# Patient Record
Sex: MALE | Race: WHITE | Employment: FULL TIME | URBAN - METROPOLITAN AREA
[De-identification: names, ages, dates, MRNs, and addresses within clinical notes are randomized per-mention and may not be internally consistent; named-entity substitution may affect disease eponyms.]

---

## 2017-07-07 ENCOUNTER — HOSPITAL ENCOUNTER (OUTPATIENT)
Age: 27
Discharge: HOME | End: 2017-07-07
Attending: EMERGENCY MEDICINE
Payer: COMMERCIAL

## 2017-07-07 VITALS
WEIGHT: 180 LBS | OXYGEN SATURATION: 97 % | RESPIRATION RATE: 14 BRPM | SYSTOLIC BLOOD PRESSURE: 141 MMHG | HEIGHT: 71 IN | HEART RATE: 82 BPM | BODY MASS INDEX: 25.2 KG/M2 | DIASTOLIC BLOOD PRESSURE: 89 MMHG | TEMPERATURE: 98 F

## 2017-07-07 DIAGNOSIS — J02.9 ACUTE VIRAL PHARYNGITIS: Primary | ICD-10-CM

## 2017-07-07 LAB — POCT RAPID STREP: NEGATIVE

## 2017-07-07 PROCEDURE — 99214 OFFICE O/P EST MOD 30 MIN: CPT

## 2017-07-07 PROCEDURE — 87430 STREP A AG IA: CPT | Performed by: EMERGENCY MEDICINE

## 2017-07-07 PROCEDURE — 87081 CULTURE SCREEN ONLY: CPT | Performed by: EMERGENCY MEDICINE

## 2017-07-07 RX ORDER — AZITHROMYCIN 250 MG/1
TABLET, FILM COATED ORAL
Qty: 1 PACKAGE | Refills: 0 | Status: SHIPPED | OUTPATIENT
Start: 2017-07-07 | End: 2017-07-12

## 2017-07-07 NOTE — ED INITIAL ASSESSMENT (HPI)
Pt stated he had a tickle in his throat yesterday. Went to bed early and had a lot of fluids. Woke up today and the throat pain had gotten worse.

## 2017-07-07 NOTE — ED PROVIDER NOTES
Patient Seen in: [facility]    History   Patient presents with:  Sore Throat    Stated Complaint: sore throat    HPI    Patient is a 24-year-old male presents emergency room with a history of sore throat that is been ongoin SpO2 97%   BMI 25.10 kg/m²         Physical Exam    GENERAL: Well-developed, well-nourished male sitting up breathing easily in no apparent distress. Patient is nontoxic in appearance. HEENT: Head is normocephalic, atraumatic.  Pupils are 4 mm equally [id number]  [phone]    Call in 2 days  OR CALL YOUR  MD FOR FOLLOW UP THIS WEEK      Medications Prescribed:  Current Discharge Medication List    START taking these medications    azithromycin (ZITHROMAX Z-TYSHAWN) 250 MG Oral Tab  500 mg once followed by 250

## 2018-07-27 ENCOUNTER — OFFICE VISIT (OUTPATIENT)
Dept: FAMILY MEDICINE CLINIC | Facility: CLINIC | Age: 28
End: 2018-07-27
Payer: COMMERCIAL

## 2018-07-27 VITALS
HEIGHT: 69 IN | SYSTOLIC BLOOD PRESSURE: 122 MMHG | TEMPERATURE: 98 F | BODY MASS INDEX: 30.51 KG/M2 | DIASTOLIC BLOOD PRESSURE: 78 MMHG | RESPIRATION RATE: 12 BRPM | HEART RATE: 84 BPM | WEIGHT: 206 LBS

## 2018-07-27 DIAGNOSIS — R14.0 POSTPRANDIAL BLOATING: Primary | ICD-10-CM

## 2018-07-27 PROCEDURE — 99203 OFFICE O/P NEW LOW 30 MIN: CPT | Performed by: FAMILY MEDICINE

## 2018-07-27 RX ORDER — OMEPRAZOLE 40 MG/1
40 CAPSULE, DELAYED RELEASE ORAL DAILY
Qty: 90 CAPSULE | Refills: 0 | Status: SHIPPED | OUTPATIENT
Start: 2018-07-27

## 2018-07-27 NOTE — PROGRESS NOTES
[facility] Family [facility] Office Note  Chief Complaint:   Patient presents with:  Bloating: bloated after ea meal      HPI:   This is a 24year old male coming in for postprandial bloating and heartburn.   Patient states over the last few weeks t cough  GASTROINTESTINAL:  Denies any abdominal pain, nausea, vomiting, constipation, diarrhea, or blood in stool. + postprandial bloating  NEUROLOGICAL:  Denies headache, dizziness, syncope, numbness or tingling in the extremities.   MUSCULOSKELETAL:  [employer] [place] learning. Medical education done. Outcome: Patient verbalizes understanding. Patient is notified to call with any questions, complications, allergies, or worsening or changing symptoms.   Patient is to call with any side effects or complications from the

## 2023-04-25 ENCOUNTER — PATIENT OUTREACH (OUTPATIENT)
Dept: CASE MANAGEMENT | Age: 33
End: 2023-04-25

## 2023-04-25 ENCOUNTER — TELEPHONE (OUTPATIENT)
Dept: FAMILY MEDICINE CLINIC | Facility: CLINIC | Age: 33
End: 2023-04-25

## 2023-04-25 NOTE — PROCEDURES
The office order for PCP removal request is Approved and finalized on April 25, 2023.     Thanks,  [facility] [employer]